# Patient Record
(demographics unavailable — no encounter records)

---

## 2024-11-17 NOTE — REVIEW OF SYSTEMS
[TextEntry] : Review of Systems: General: No weight loss, No recent fever, chills HEENT: no headache, no change in vision or hearing Pulmonary: No SOB, or cough  CVS: No chest pain, MCNEIL, or change in exercise tolerance  Gastrointestinal: No Nausea/vomiting/constipation/diarrhea : No complaint of dysuria or urinary frequency, no excess foaming  Skin: Denies no new rash, lesions, ulcer  Musco skeletal: No edema  Neurological: No headache, dizziness, vertigo

## 2024-11-17 NOTE — ASSESSMENT
[FreeTextEntry1] : Assessment: CKD stage 3B Renal function has slightly worsened Hyperkalemia has resolved.  No peripheral edema Bp controlled

## 2024-11-17 NOTE — PLAN
[TextEntry] : Plan: Continue current medications HCTZ 12.5 mg 3x a week Vitamin D 1.25 mg 1x every three weeks Maintain physical activity  Increase fluid intake  Follow up with recommended workups in 4 months

## 2024-11-17 NOTE — RESULTS/DATA
[TextEntry] : Labs: 11/7/24 141/4/103/32/35/1.9/gfr39/ca9.5  RF 31 VIT D 95  8/12/24 138/5.6/100/33/30/1.72/ (44)/ca: 9.7  esr 22, crn4.3, MAREN negative, C3 96 RF 33  6/14/24 136/4.3/101/29/27/1.75/(43)/ca 9.2 cbc 6.5/10.3/180

## 2024-11-17 NOTE — HISTORY OF PRESENT ILLNESS
[FreeTextEntry1] : 11/15/24 Follow up Serum potassium has improved  No edema   Initial evaluation 7/31/24 referred for elevated creatinine 63-year-old male with PMHx of heroin use, on methadone and BPD. Comes for initial elevation of serum creatinine 1.75 6/14/24. 3/27/24 1.41 Denies NSAIDs use and no peripheral edema. At this time immunofixation serum shows faint IgG (Kappa) monoclonal